# Patient Record
Sex: MALE | NOT HISPANIC OR LATINO | URBAN - METROPOLITAN AREA
[De-identification: names, ages, dates, MRNs, and addresses within clinical notes are randomized per-mention and may not be internally consistent; named-entity substitution may affect disease eponyms.]

---

## 2021-05-26 ENCOUNTER — IMPORTED ENCOUNTER (OUTPATIENT)
Dept: URBAN - METROPOLITAN AREA CLINIC 1 | Facility: CLINIC | Age: 66
End: 2021-05-26

## 2021-05-26 PROBLEM — H52.223: Noted: 2021-05-26

## 2021-05-26 PROBLEM — H52.03: Noted: 2021-05-26

## 2021-05-26 PROBLEM — H52.4: Noted: 2021-05-26

## 2021-05-26 PROCEDURE — S0620 ROUTINE OPHTHALMOLOGICAL EXA: HCPCS

## 2021-05-26 NOTE — PATIENT DISCUSSION
1.  Hyperopia with Atigmatism OU: Rx was given for corrective spectacles if indicated. 2.  [id number]. Combined Cataracts OU - Observe4. Dry Eyes with PEK OU - Recommend ATs TID OU routinely. (Sample of Blink given) 5. Corneal Arcus OUReturn for an appointment in 6 months 27 with Dr. Abdirizak Shepard. Return for an appointment in 1 year 36 with Dr. Abdirizak Shepard.

## 2022-04-02 ASSESSMENT — KERATOMETRY
OD_K1POWER_DIOPTERS: 41.50
OD_AXISANGLE_DEGREES: 180
OS_AXISANGLE_DEGREES: 002
OS_K2POWER_DIOPTERS: 41.75
OS_K1POWER_DIOPTERS: 41.50
OD_K2POWER_DIOPTERS: 41.50
OD_AXISANGLE2_DEGREES: 090
OS_AXISANGLE2_DEGREES: 092

## 2022-04-02 ASSESSMENT — VISUAL ACUITY
OD_CC: J2
OS_CC: J1
OD_SC: 20/40-1
OS_SC: 20/25

## 2022-04-02 ASSESSMENT — TONOMETRY
OS_IOP_MMHG: 16
OD_IOP_MMHG: 16

## 2022-08-17 ENCOUNTER — COMPREHENSIVE EXAM (OUTPATIENT)
Dept: URBAN - METROPOLITAN AREA CLINIC 1 | Facility: CLINIC | Age: 67
End: 2022-08-17

## 2022-08-17 DIAGNOSIS — H52.4: ICD-10-CM

## 2022-08-17 DIAGNOSIS — H52.223: ICD-10-CM

## 2022-08-17 DIAGNOSIS — H52.03: ICD-10-CM

## 2022-08-17 PROCEDURE — 92015 DETERMINE REFRACTIVE STATE: CPT

## 2022-08-17 PROCEDURE — 92014 COMPRE OPH EXAM EST PT 1/>: CPT

## 2022-08-17 ASSESSMENT — VISUAL ACUITY
OD_CC: 20/20-2
OS_CC: 20/25-1
OS_CC: J1+
OD_CC: J1+

## 2022-08-17 ASSESSMENT — KERATOMETRY
OD_AXISANGLE_DEGREES: 180
OD_AXISANGLE2_DEGREES: 090
OD_K1POWER_DIOPTERS: 41.50
OS_AXISANGLE_DEGREES: 002
OD_K2POWER_DIOPTERS: 41.50
OS_K2POWER_DIOPTERS: 41.75
OS_AXISANGLE2_DEGREES: 092
OS_K1POWER_DIOPTERS: 41.50

## 2022-08-17 ASSESSMENT — TONOMETRY
OS_IOP_MMHG: 18
OD_IOP_MMHG: 18

## 2024-11-07 ENCOUNTER — OFFICE VISIT (OUTPATIENT)
Age: 69
End: 2024-11-07

## 2024-11-07 DIAGNOSIS — S86.011A STRAIN OF RIGHT ACHILLES TENDON, INITIAL ENCOUNTER: ICD-10-CM

## 2024-11-07 DIAGNOSIS — M79.671 RIGHT FOOT PAIN: ICD-10-CM

## 2024-11-07 DIAGNOSIS — M76.61 ACHILLES TENDINITIS OF RIGHT LOWER EXTREMITY: Primary | ICD-10-CM
